# Patient Record
Sex: FEMALE | Race: ASIAN | HISPANIC OR LATINO | Employment: FULL TIME | URBAN - METROPOLITAN AREA
[De-identification: names, ages, dates, MRNs, and addresses within clinical notes are randomized per-mention and may not be internally consistent; named-entity substitution may affect disease eponyms.]

---

## 2023-03-25 ENCOUNTER — HOSPITAL ENCOUNTER (EMERGENCY)
Facility: HOSPITAL | Age: 33
Discharge: HOME | End: 2023-03-25
Attending: EMERGENCY MEDICINE

## 2023-03-25 VITALS
OXYGEN SATURATION: 100 % | SYSTOLIC BLOOD PRESSURE: 115 MMHG | RESPIRATION RATE: 18 BRPM | DIASTOLIC BLOOD PRESSURE: 76 MMHG | TEMPERATURE: 98.3 F | HEART RATE: 81 BPM

## 2023-03-25 DIAGNOSIS — J98.01 BRONCHOSPASM: ICD-10-CM

## 2023-03-25 DIAGNOSIS — B97.89 VIRAL RESPIRATORY INFECTION: Primary | ICD-10-CM

## 2023-03-25 DIAGNOSIS — J98.8 VIRAL RESPIRATORY INFECTION: Primary | ICD-10-CM

## 2023-03-25 LAB
ATRIAL RATE: 75 BPM
FLUAV RNA RESP QL NAA+PROBE: NEGATIVE
FLUBV RNA RESP QL NAA+PROBE: NEGATIVE
P AXIS: 25 DEGREES
PR INTERVAL: 138 MS
QRS AXIS: 23 DEGREES
QRSD INTERVAL: 74 MS
QT INTERVAL: 418 MS
QTC INTERVAL: 466 MS
RSV RNA RESP QL NAA+PROBE: NEGATIVE
SARS-COV-2 RNA RESP QL NAA+PROBE: NEGATIVE
T WAVE AXIS: -19 DEGREES
VENTRICULAR RATE: 75 BPM

## 2023-03-25 RX ORDER — OXYMETAZOLINE HYDROCHLORIDE 0.05 G/100ML
2 SPRAY NASAL ONCE
Status: COMPLETED | OUTPATIENT
Start: 2023-03-25 | End: 2023-03-25

## 2023-03-25 RX ORDER — METHYLPREDNISOLONE ACETATE 80 MG/ML
40 INJECTION, SUSPENSION INTRA-ARTICULAR; INTRALESIONAL; INTRAMUSCULAR; SOFT TISSUE ONCE
Status: COMPLETED | OUTPATIENT
Start: 2023-03-25 | End: 2023-03-25

## 2023-03-25 RX ORDER — ALBUTEROL SULFATE 90 UG/1
2 AEROSOL, METERED RESPIRATORY (INHALATION) EVERY 6 HOURS PRN
Qty: 8 G | Refills: 3 | Status: SHIPPED | OUTPATIENT
Start: 2023-03-25

## 2023-03-25 RX ORDER — ALBUTEROL SULFATE 2.5 MG/3ML
1 SOLUTION RESPIRATORY (INHALATION) ONCE
Status: COMPLETED | OUTPATIENT
Start: 2023-03-25 | End: 2023-03-25

## 2023-03-25 RX ADMIN — OXYMETAZOLINE HYDROCHLORIDE 2 SPRAY: 0.05 SPRAY NASAL at 13:57

## 2023-03-25 RX ADMIN — METHYLPREDNISOLONE ACETATE 40 MG: 80 INJECTION, SUSPENSION INTRA-ARTICULAR; INTRALESIONAL; INTRAMUSCULAR; SOFT TISSUE at 16:08

## 2023-03-25 NOTE — DISCHARGE INSTRUCTIONS
Diagnosis; viral respiratory infection  with bronchospasm- wheezing    - activity as tolerated     - for nasal congestion- afrin nasal spray -- 2 sprays per nostril 2 times per day for 3 days    - albuterol inhaler 2 puffs 4 times a day for next 3-5 days- then as needed     - please return to  the er for any increasing difficulty breathing- increasing wheezing     - the intramuscular injection that you received- helps with wheezing

## 2023-03-27 LAB
ATRIAL RATE: 75 BPM
P AXIS: 25 DEGREES
PR INTERVAL: 138 MS
QRS AXIS: 23 DEGREES
QRSD INTERVAL: 74 MS
QT INTERVAL: 418 MS
QTC INTERVAL: 466 MS
T WAVE AXIS: -19 DEGREES
VENTRICULAR RATE: 75 BPM

## 2023-04-02 NOTE — ED PROVIDER NOTES
"History  Chief Complaint   Patient presents with   • Nasal Congestion     Pt arrives via EMS c/o feeling like she was unable to breath for several hours this morning \"due to her condition of nasal congestion\"  States she gets allergies to dust, uses nasal spray regularly but did not help today  Denies fever, cough, or sore throat  Denies CP or SOB from chest area at this time  States earlier this morning she felt \"a little SOB from her chest\" but that improved with the albuterol treatment EMS gave enroute  • Shortness of Breath     35 yr female with hx of environmental allergies with acute nasal congestion- sob - today -- now improved- came by ems unsure of any neb tx-- pt with no hx of wheezing-- currently feels much improved- no cp at anytinme currently no sob- no fevers/ ill contacts- no h of vte- no ble edema/chadwick/orthpnea/ pnd      History provided by:  Patient   used: No    Shortness of Breath  Severity:  Moderate  Associated symptoms: no cough, no diaphoresis, no ear pain, no fever, no sore throat and no wheezing        Prior to Admission Medications   Prescriptions Last Dose Informant Patient Reported? Taking?    Fluticasone Propionate (Xhance) 93 MCG/ACT EXHU   No No   Si spray into each nostril in the morning   azelastine (ASTELIN) 0 1 % nasal spray   Yes No   Si spray into each nostril 2 (two) times a day Use in each nostril as directed   cetirizine (ZyrTEC) 10 mg tablet   Yes No   Sig: Take 10 mg by mouth daily   fluticasone (VERAMYST) 27 5 MCG/SPRAY nasal spray   Yes No   Si sprays into each nostril daily   oxymetazoline (AFRIN) 0 05 % nasal spray   Yes No   Si sprays by Each Nare route 2 (two) times a day      Facility-Administered Medications: None       Past Medical History:   Diagnosis Date   • Otitis media        Past Surgical History:   Procedure Laterality Date   • INNER EAR SURGERY Right        Family History   Problem Relation Age of Onset   • Allergies " Sister      I have reviewed and agree with the history as documented  E-Cigarette/Vaping   • E-Cigarette Use Never User      E-Cigarette/Vaping Substances     Social History     Tobacco Use   • Smoking status: Never   • Smokeless tobacco: Never   Vaping Use   • Vaping Use: Never used   Substance Use Topics   • Alcohol use: Never   • Drug use: Never       Review of Systems   Constitutional: Positive for activity change  Negative for appetite change, chills, diaphoresis, fatigue, fever and unexpected weight change  HENT: Positive for congestion  Negative for dental problem, drooling, ear discharge, ear pain, facial swelling, hearing loss, mouth sores, nosebleeds, postnasal drip, rhinorrhea, sinus pressure, sinus pain, sneezing, sore throat, tinnitus, trouble swallowing and voice change  Eyes: Negative  Respiratory: Positive for shortness of breath  Negative for apnea, cough, choking, chest tightness, wheezing and stridor  Cardiovascular: Negative  Gastrointestinal: Negative  Endocrine: Negative  Genitourinary: Negative  Musculoskeletal: Negative  Skin: Negative  Allergic/Immunologic: Negative  Neurological: Negative  Hematological: Negative  Psychiatric/Behavioral: Negative  Physical Exam  Physical Exam  Vitals and nursing note reviewed  Constitutional:       General: She is not in acute distress  Appearance: She is well-developed  She is not ill-appearing, toxic-appearing or diaphoretic  Interventions: She is not intubated  Comments: avss-- pulse ox 100 % on ra- interpretation is normal- well appearing in nad    HENT:      Head: Normocephalic and atraumatic  Comments: No bilateral max/frontal sinus tenderness/edema/ erythema     Mouth/Throat:      Mouth: Mucous membranes are moist       Pharynx: No pharyngeal swelling or oropharyngeal exudate  Eyes:      Extraocular Movements: Extraocular movements intact        Pupils: Pupils are equal, round, and reactive to light  Comments: Mm pink   Neck:      Thyroid: No thyromegaly  Vascular: No hepatojugular reflux or JVD  Trachea: No tracheal deviation  Comments: No pmt c/t/l/s spine- no menigneal sign   Cardiovascular:      Rate and Rhythm: Normal rate and regular rhythm  No extrasystoles are present  Pulses: Normal pulses  No decreased pulses  Heart sounds: Normal heart sounds  No murmur heard  No friction rub  No gallop  Pulmonary:      Effort: Pulmonary effort is normal  No tachypnea, bradypnea, accessory muscle usage or respiratory distress  She is not intubated  Breath sounds: Normal breath sounds  No stridor  No decreased breath sounds, wheezing, rhonchi or rales  Chest:      Chest wall: No mass, deformity, tenderness, crepitus or edema  There is no dullness to percussion  Abdominal:      General: Bowel sounds are normal       Palpations: Abdomen is soft  There is no hepatomegaly, splenomegaly or mass  Tenderness: There is no abdominal tenderness  There is no guarding or rebound  Comments: Soft ntnd- no hsm- no cva tenderness- no ascites- no peritoneal signs    Musculoskeletal:         General: Normal range of motion  Cervical back: Normal range of motion and neck supple  Right lower leg: No tenderness  No edema  Left lower leg: No tenderness  No edema  Comments: Equal bilateral radial/dp pulses- no ble edema/calf tenderness/asym/ erythema   Lymphadenopathy:      Cervical: No cervical adenopathy  Skin:     General: Skin is warm  Capillary Refill: Capillary refill takes less than 2 seconds  Coloration: Skin is not cyanotic or pale  Findings: No ecchymosis, erythema or rash  Nails: There is no clubbing  Neurological:      General: No focal deficit present  Mental Status: She is alert and oriented to person, place, and time  Cranial Nerves: No cranial nerve deficit  Motor: No weakness        Comments: Normal non focal neuro exam    Psychiatric:         Mood and Affect: Mood normal  Mood is not anxious  Behavior: Behavior normal  Behavior is not agitated  Vital Signs  ED Triage Vitals   Temperature Pulse Respirations Blood Pressure SpO2   03/25/23 1422 03/25/23 1306 03/25/23 1306 03/25/23 1315 03/25/23 1306   98 3 °F (36 8 °C) 73 16 111/81 100 %      Temp Source Heart Rate Source Patient Position - Orthostatic VS BP Location FiO2 (%)   03/25/23 1422 03/25/23 1306 03/25/23 1306 03/25/23 1306 --   Oral Monitor Sitting Left arm       Pain Score       --                  Vitals:    03/25/23 1306 03/25/23 1315 03/25/23 1400 03/25/23 1600   BP:  111/81 112/77 115/76   Pulse: 73 75 76 81   Patient Position - Orthostatic VS: Sitting            Visual Acuity      ED Medications  Medications   albuterol (FOR EMS ONLY) (2 5 mg/3 mL) 0 083 % inhalation solution 2 5 mg (0 mg Does not apply Given to EMS 3/25/23 1317)   oxymetazoline (AFRIN) 0 05 % nasal spray 2 spray (2 sprays Each Nare Given 3/25/23 1357)   methylPREDNISolone acetate (DEPO-MEDROL) injection 40 mg (40 mg Intramuscular Given 3/25/23 1608)       Diagnostic Studies  Results Reviewed     Procedure Component Value Units Date/Time    COVID/FLU/RSV [[id number]]  (Normal) Collected: 03/25/23 1357    Lab Status: Final result Specimen: Nares from Nose Updated: 03/25/23 1526     SARS-CoV-2 Negative     INFLUENZA A PCR Negative     INFLUENZA B PCR Negative     RSV PCR Negative    Narrative:      FOR PEDIATRIC PATIENTS - copy/paste COVID Guidelines URL to browser: [email or web address]    SARS-CoV-2 assay is a Nucleic Acid Amplification assay intended for the  qualitative detection of nucleic acid from SARS-CoV-2 in nasopharyngeal  swabs  Results are for the presumptive identification of SARS-CoV-2 RNA      Positive results are indicative of infection with SARS-CoV-2, the virus  causing COVID-19, but do not rule out bacterial infection or co-infection  with other viruses  Laboratories within the [place] and its  territories are required to report all positive results to the appropriate  public health authorities  Negative results do not preclude SARS-CoV-2  infection and should not be used as the sole basis for treatment or other  patient management decisions  Negative results must be combined with  clinical observations, patient history, and epidemiological information  This test has not been FDA cleared or approved  This test has been authorized by FDA under an Emergency Use Authorization  (EUA)  This test is only authorized for the duration of time the  declaration that circumstances exist justifying the authorization of the  emergency use of an in vitro diagnostic tests for detection of SARS-CoV-2  virus and/or diagnosis of COVID-19 infection under section 564(b)(1) of  the Act, 21 U  S C  613MFU-0(V)(4), unless the authorization is terminated  or revoked sooner  The test has been validated but independent review by FDA  and CLIA is pending  Test performed using [employer] Gene[employer]pert: This RT-PCR assay targets N2,  a region unique to SARS-CoV-2  A conserved region in the E-gene was chosen  for pan-Sarbecovirus detection which includes SARS-CoV-2  According to CMS-2020-01-R, this platform meets the definition of high-throughput technology  No orders to display              Procedures  Procedures         ED Course  ED Course as of 04/02/23 1522   Sat Mar 25, 2023   1542 Er md note- pt fasting for ramadan- can have nothing by mouth   1610 Er md note- pt- re-evaluated- resting in nad-- on repeat lung exam - faint bilateral expir wheezes-  but good aeration --  do to ramadan with on oral medications  will give im dep medrol  for wheezing and alb mdi to go                                             Medical Decision Making  After h and p- pt with ?  Bronchospasm triggered by allergci/ uri comps- er md helena doubts any severe pulm cause- ptx/ cap/ vte-- pt will need er period of obs and re-eval     Bronchospasm: acute illness or injury     Details: see abo ve  Viral respiratory infection: acute illness or injury     Details: verse posbel allergen -- see abov e   Amount and/or Complexity of Data Reviewed  Labs: ordered  Details: reviewed by er md   Discussion of management or test interpretation with external provider(s): moderate amount of er md thought complexity and midl amount of er workup- but with repeated evaluations and assessments     Risk  OTC drugs  Prescription drug management  Decision regarding hospitalization  Disposition  Final diagnoses:   Viral respiratory infection   Bronchospasm     Time reflects when diagnosis was documented in both MDM as applicable and the Disposition within this note     Time User Action Codes Description Comment    3/25/2023  4:12 PM Chan Anaya [R02 [id number]  [id number]] Viral respiratory infection     3/25/2023  4:12 PM Chan Anaya [[id number]] Bronchospasm       ED Disposition     ED Disposition   Discharge    Condition   Stable    Date/Time   Sat Mar 25, 2023  4:12 PM    Comment   Barbie Lucero discharge to home/self care                 Follow-up Information    None         Discharge Medication List as of 3/25/2023  4:34 PM      START taking these medications    Details   albuterol (PROVENTIL HFA,VENTOLIN HFA) 90 mcg/act inhaler Inhale 2 puffs every 6 (six) hours as needed for wheezing or shortness of breath for up to 7 doses With spacer 2 puffs 4 times a day for the next 3-5 days- then as needed, Starting Sat 3/25/2023, Print         CONTINUE these medications which have NOT CHANGED    Details   azelastine (ASTELIN) 0 1 % nasal spray 1 spray into each nostril 2 (two) times a day Use in each nostril as directed, Historical Med      cetirizine (ZyrTEC) 10 mg tablet Take 10 mg by mouth daily, Historical Med      fluticasone (VERAMYST) 27 5 MCG/SPRAY nasal spray 2 sprays into each nostril daily, Historical Med      Fluticasone Propionate (Xhance) 93 MCG/ACT EXHU 1 spray into each nostril in the morning, Starting Fri 3/24/2023, Normal      oxymetazoline (AFRIN) 0 05 % nasal spray 2 sprays by Each Nare route 2 (two) times a day, Historical Med             No discharge procedures on file      PDMP Review     None          ED Provider  Electronically Signed by           Jetta Schwab, MD  04/02/23 8477